# Patient Record
(demographics unavailable — no encounter records)

---

## 2025-04-01 NOTE — HEALTH RISK ASSESSMENT
[Good] : ~his/her~  mood as  good [Yes] : Yes [Monthly or less (1 pt)] : Monthly or less (1 point) [1 or 2 (0 pts)] : 1 or 2 (0 points) [Never (0 pts)] : Never (0 points) [No] : In the past 12 months have you used drugs other than those required for medical reasons? No [No falls in past year] : Patient reported no falls in the past year [0] : 2) Feeling down, depressed, or hopeless: Not at all (0) [Patient reported mammogram was normal] : Patient reported mammogram was normal [Patient reported PAP Smear was normal] : Patient reported PAP Smear was normal [With Family] : lives with family [] :  [# Of Children ___] : has [unfilled] children [Sexually Active] : sexually active [Feels Safe at Home] : Feels safe at home [Fully functional (bathing, dressing, toileting, transferring, walking, feeding)] : Fully functional (bathing, dressing, toileting, transferring, walking, feeding) [Fully functional (using the telephone, shopping, preparing meals, housekeeping, doing laundry, using] : Fully functional and needs no help or supervision to perform IADLs (using the telephone, shopping, preparing meals, housekeeping, doing laundry, using transportation, managing medications and managing finances) [Smoke Detector] : smoke detector [Safety elements used in home] : safety elements used in home [Seat Belt] :  uses seat belt [PHQ-2 Negative - No further assessment needed] : PHQ-2 Negative - No further assessment needed [None] : None [Employed] : employed [Reports normal functional visual acuity (ie: able to read med bottle)] : Reports normal functional visual acuity [Carbon Monoxide Detector] : carbon monoxide detector [Sunscreen] : uses sunscreen [de-identified] : when in [place], was regularly doing exercises and fitness classes  [de-identified] : ok [facility] [[id number]] : 0 [HIV test declined] : HIV test declined [Hepatitis C test declined] : Hepatitis C test declined [Change in mental status noted] : No change in mental status noted [Language] : denies difficulty with language [Behavior] : denies difficulty with behavior [Learning/Retaining New Information] : denies difficulty learning/retaining new information [Handling Complex Tasks] : denies difficulty handling complex tasks [Reasoning] : denies difficulty with reasoning [Spatial Ability and Orientation] : denies difficulty with spatial ability and orientation [Reports changes in hearing] : Reports no changes in hearing [Reports changes in vision] : Reports no changes in vision [Reports changes in dental health] : Reports no changes in dental health [Travel to Developing Areas] : does not  travel to developing areas [TB Exposure] : is not being exposed to tuberculosis [Caregiver Concerns] : does not have caregiver concerns [MammogramDate] : 2024 [MammogramComments] : dense [PapSmearDate] : 04/2024 [BoneDensityDate] : 2023 [BoneDensityComments] : needed [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

## 2025-04-01 NOTE — HISTORY OF PRESENT ILLNESS
[FreeTextEntry1] : est care [de-identified] : Patient is a 65 year old F with osteopenia here to establish care and complete medicare annual wellness syndrome  Osteopenia history of osteoporosis on fosfomax but on drug holiday at this time last DEXA 2023   Right Shoulder likely had tendonitis and tendinopathy  in october had initial injury while at golf - this healed but then when she was moving in december was straining it while lifting boxes exacerbated the pain while continuing to use it heavily  Right Knee injured recently after walking started 10 days ago icing with advil  has changed over shoes as well

## 2025-04-01 NOTE — PLAN
[FreeTextEntry1] : Welcome to Medicare EKG obtained in office today -sinus rhythm, low voltage baseline PCV21 administered in office a1c and lipid panel for ascvd risk calc vit D and DEXA scan given history of osteoporosis, now osteopenia on drug holiday from fosfamax - reviewed patient's old records from [facility]  mammogram ordered colon cancer screening: review at next appt  no concerns for memory changes, vision changes has hearing changes and recommended aides in the past but worried about cost  completes all adl/iadls without concern for mobility issues   Right Shoulder Injury acute since fall time with improvement after rest but has exacerbated it intermittently over the past couple of months. likely has some degree of rotator cuff pathology given physical exam ddx - tear vs sprain vs OA  plan xray of the shoulder PT referral

## 2025-04-01 NOTE — HEALTH RISK ASSESSMENT
[Good] : ~his/her~  mood as  good [Yes] : Yes [Monthly or less (1 pt)] : Monthly or less (1 point) [1 or 2 (0 pts)] : 1 or 2 (0 points) [Never (0 pts)] : Never (0 points) [No] : In the past 12 months have you used drugs other than those required for medical reasons? No [No falls in past year] : Patient reported no falls in the past year [0] : 2) Feeling down, depressed, or hopeless: Not at all (0) [Patient reported mammogram was normal] : Patient reported mammogram was normal [Patient reported PAP Smear was normal] : Patient reported PAP Smear was normal [With Family] : lives with family [] :  [# Of Children ___] : has [unfilled] children [Sexually Active] : sexually active [Feels Safe at Home] : Feels safe at home [Fully functional (bathing, dressing, toileting, transferring, walking, feeding)] : Fully functional (bathing, dressing, toileting, transferring, walking, feeding) [Fully functional (using the telephone, shopping, preparing meals, housekeeping, doing laundry, using] : Fully functional and needs no help or supervision to perform IADLs (using the telephone, shopping, preparing meals, housekeeping, doing laundry, using transportation, managing medications and managing finances) [Smoke Detector] : smoke detector [Safety elements used in home] : safety elements used in home [Seat Belt] :  uses seat belt [PHQ-2 Negative - No further assessment needed] : PHQ-2 Negative - No further assessment needed [None] : None [Employed] : employed [Reports normal functional visual acuity (ie: able to read med bottle)] : Reports normal functional visual acuity [Carbon Monoxide Detector] : carbon monoxide detector [Sunscreen] : uses sunscreen [de-identified] : when in [place], was regularly doing exercises and fitness classes  [de-identified] : ok [facility] [[id number]] : 0 [HIV test declined] : HIV test declined [Hepatitis C test declined] : Hepatitis C test declined [Change in mental status noted] : No change in mental status noted [Language] : denies difficulty with language [Behavior] : denies difficulty with behavior [Learning/Retaining New Information] : denies difficulty learning/retaining new information [Handling Complex Tasks] : denies difficulty handling complex tasks [Reasoning] : denies difficulty with reasoning [Spatial Ability and Orientation] : denies difficulty with spatial ability and orientation [Reports changes in hearing] : Reports no changes in hearing [Reports changes in vision] : Reports no changes in vision [Reports changes in dental health] : Reports no changes in dental health [Travel to Developing Areas] : does not  travel to developing areas [TB Exposure] : is not being exposed to tuberculosis [Caregiver Concerns] : does not have caregiver concerns [MammogramDate] : 2024 [MammogramComments] : dense [PapSmearDate] : 04/2024 [BoneDensityDate] : 2023 [BoneDensityComments] : needed [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

## 2025-04-01 NOTE — HISTORY OF PRESENT ILLNESS
[FreeTextEntry1] : est care [de-identified] : Patient is a 65 year old F with osteopenia here to establish care and complete medicare annual wellness syndrome  Osteopenia history of osteoporosis on fosfomax but on drug holiday at this time last DEXA 2023   Right Shoulder likely had tendonitis and tendinopathy  in october had initial injury while at golf - this healed but then when she was moving in december was straining it while lifting boxes exacerbated the pain while continuing to use it heavily  Right Knee injured recently after walking started 10 days ago icing with advil  has changed over shoes as well

## 2025-04-01 NOTE — PHYSICAL EXAM
[Normal Sclera/Conjunctiva] : normal sclera/conjunctiva [PERRL] : pupils equal round and reactive to light [Normal Outer Ear/Nose] : the outer ears and nose were normal in appearance [No Edema] : there was no peripheral edema [Coordination Grossly Intact] : coordination grossly intact [No Focal Deficits] : no focal deficits [Normal Gait] : normal gait [Normal] : affect was normal and insight and judgment were intact [de-identified] : right shoulder: no tenderness to palpation; without limitation in ROM but pain elicited with empty can testing

## 2025-04-01 NOTE — PLAN
[FreeTextEntry1] : Welcome to Medicare EKG obtained in office today -sinus rhythm, low voltage baseline PCV21 administered in office a1c and lipid panel for ascvd risk calc vit D and DEXA scan given history of osteoporosis, now osteopenia on drug holiday from fosfamax - reviewed patient's old records from [facility]  mammogram ordered colon cancer screening: review at next appt  no concerns for memory changes, vision changes has hearing changes and recommended aides in the past but worried about cost  completes all adl/iadls without concern for mobility issues   Right Shoulder Injury acute since fall time with improvement after rest but has exacerbated it intermittently over the past couple of months. likely has some degree of rotator cuff pathology given physical exam ddx - tear vs sprain vs OA  plan xray of the shoulder PT referral

## 2025-04-01 NOTE — PHYSICAL EXAM
[Normal Sclera/Conjunctiva] : normal sclera/conjunctiva [PERRL] : pupils equal round and reactive to light [Normal Outer Ear/Nose] : the outer ears and nose were normal in appearance [No Edema] : there was no peripheral edema [Coordination Grossly Intact] : coordination grossly intact [No Focal Deficits] : no focal deficits [Normal Gait] : normal gait [Normal] : affect was normal and insight and judgment were intact [de-identified] : right shoulder: no tenderness to palpation; without limitation in ROM but pain elicited with empty can testing